# Patient Record
Sex: MALE | URBAN - METROPOLITAN AREA
[De-identification: names, ages, dates, MRNs, and addresses within clinical notes are randomized per-mention and may not be internally consistent; named-entity substitution may affect disease eponyms.]

---

## 2020-05-14 ENCOUNTER — APPOINTMENT (RX ONLY)
Dept: URBAN - METROPOLITAN AREA CLINIC 45 | Facility: CLINIC | Age: 20
Setting detail: DERMATOLOGY
End: 2020-05-14

## 2020-05-14 DIAGNOSIS — L63.8 OTHER ALOPECIA AREATA: ICD-10-CM

## 2020-05-14 DIAGNOSIS — L85.3 XEROSIS CUTIS: ICD-10-CM

## 2020-05-14 DIAGNOSIS — D22 MELANOCYTIC NEVI: ICD-10-CM

## 2020-05-14 PROBLEM — D22.61 MELANOCYTIC NEVI OF RIGHT UPPER LIMB, INCLUDING SHOULDER: Status: ACTIVE | Noted: 2020-05-14

## 2020-05-14 PROCEDURE — ? INTRALESIONAL KENALOG

## 2020-05-14 PROCEDURE — ? TREATMENT REGIMEN

## 2020-05-14 PROCEDURE — ? COUNSELING

## 2020-05-14 PROCEDURE — 99203 OFFICE O/P NEW LOW 30 MIN: CPT | Mod: 25

## 2020-05-14 ASSESSMENT — LOCATION ZONE DERM
LOCATION ZONE: SCALP
LOCATION ZONE: TRUNK
LOCATION ZONE: ARM

## 2020-05-14 ASSESSMENT — LOCATION DETAILED DESCRIPTION DERM
LOCATION DETAILED: LEFT SUPERIOR UPPER BACK
LOCATION DETAILED: RIGHT CENTRAL PARIETAL SCALP
LOCATION DETAILED: RIGHT DISTAL POSTERIOR UPPER ARM

## 2020-05-14 ASSESSMENT — LOCATION SIMPLE DESCRIPTION DERM
LOCATION SIMPLE: LEFT UPPER BACK
LOCATION SIMPLE: RIGHT UPPER ARM
LOCATION SIMPLE: SCALP

## 2020-05-14 NOTE — PROCEDURE: TREATMENT REGIMEN
Detail Level: Zone
Plan: Location: Posterior scalp\\nTreatment: injected with 1cc of 5FU/ILK 40\\n\\nPt is here for hair loss on scalp.\\nPt discussed that her hair loss was sudden and is very concerned.\\nToday pt has circular bald patches throughout her scalp.\\nPictures taken.\\n\\nDiscussed with patient this is also an auto-immune condition usually genetic, onset by increased stress and a lowered immune system.\\nAdvised patient to that we can treatment with 5FU/ILK 40 to help relieve inflammation.\\nDiscussed with pt to start using Rogaine to help produce hair growth from the external as well---discussed hair may fall out for the first few weeks. \\nAlso advised patient to try and keep stress under control in order to prevent further breakouts of hair loss.\\nF/u as needed

## 2020-05-14 NOTE — PROCEDURE: INTRALESIONAL KENALOG
Detail Level: Detailed
Medical Necessity Clause: This procedure was medically necessary because the lesions that were treated were:
X Size Of Lesion In Cm (Optional): 0
Consent: The risks of atrophy were reviewed with the patient.
Total Volume Injected (Ccs- Only Use Numbers And Decimals): 0.1
Concentration Of Solution Injected (Mg/Ml): 2.5
Kenalog Preparation: Kenalog
Include Z78.9 (Other Specified Conditions Influencing Health Status) As An Associated Diagnosis?: No

## 2020-06-11 ENCOUNTER — APPOINTMENT (RX ONLY)
Dept: URBAN - METROPOLITAN AREA CLINIC 45 | Facility: CLINIC | Age: 20
Setting detail: DERMATOLOGY
End: 2020-06-11

## 2020-06-11 DIAGNOSIS — D22 MELANOCYTIC NEVI: ICD-10-CM

## 2020-06-11 DIAGNOSIS — L63.8 OTHER ALOPECIA AREATA: ICD-10-CM

## 2020-06-11 DIAGNOSIS — L85.3 XEROSIS CUTIS: ICD-10-CM

## 2020-06-11 PROBLEM — D22.61 MELANOCYTIC NEVI OF RIGHT UPPER LIMB, INCLUDING SHOULDER: Status: ACTIVE | Noted: 2020-06-11

## 2020-06-11 PROCEDURE — ? INTRALESIONAL KENALOG

## 2020-06-11 PROCEDURE — 99213 OFFICE O/P EST LOW 20 MIN: CPT | Mod: 25

## 2020-06-11 PROCEDURE — ? COUNSELING

## 2020-06-11 PROCEDURE — ? TREATMENT REGIMEN

## 2020-06-11 ASSESSMENT — LOCATION ZONE DERM
LOCATION ZONE: TRUNK
LOCATION ZONE: SCALP
LOCATION ZONE: ARM

## 2020-06-11 ASSESSMENT — LOCATION SIMPLE DESCRIPTION DERM
LOCATION SIMPLE: SCALP
LOCATION SIMPLE: RIGHT UPPER ARM
LOCATION SIMPLE: LEFT UPPER BACK

## 2020-06-11 ASSESSMENT — LOCATION DETAILED DESCRIPTION DERM
LOCATION DETAILED: RIGHT DISTAL POSTERIOR UPPER ARM
LOCATION DETAILED: LEFT SUPERIOR UPPER BACK
LOCATION DETAILED: RIGHT CENTRAL PARIETAL SCALP

## 2020-06-11 NOTE — PROCEDURE: TREATMENT REGIMEN
Detail Level: Zone
Plan: Location: Posterior scalp\\nTreatment: injected with 1cc of 5FU/ILK 40\\n\\nPhotos taken. \\n\\nPatient is here for a six week follow up on hair loss. \\nHe states that he has noticed hair growth after his last injections. \\nHe states that he is pleased to see this growth, but wishes that it would happen a little faster. \\n\\nDiscussed that there is a lot of new growth today which indicates that he is responding well to these injections. \\nDiscussed with patient that today we will inject with 1cc of 5FU/ILK 40 to promote hair growth. \\nDiscussed with pt to start using Rogaine to help produce hair growth from the external as well---discussed hair may fall out for the first few weeks. \\nAlso advised patient to try and keep stress under control in order to prevent further breakouts of hair loss.\\n\\nF/u 6 weeks

## 2020-06-11 NOTE — PROCEDURE: INTRALESIONAL KENALOG
Detail Level: Detailed
Kenalog Preparation: Kenalog
Include Z78.9 (Other Specified Conditions Influencing Health Status) As An Associated Diagnosis?: No
Total Volume Injected (Ccs- Only Use Numbers And Decimals): 0.1
Medical Necessity Clause: This procedure was medically necessary because the lesions that were treated were:
X Size Of Lesion In Cm (Optional): 0
Concentration Of Solution Injected (Mg/Ml): 2.5
Consent: The risks of atrophy were reviewed with the patient.

## 2020-07-23 ENCOUNTER — APPOINTMENT (RX ONLY)
Dept: URBAN - METROPOLITAN AREA CLINIC 45 | Facility: CLINIC | Age: 20
Setting detail: DERMATOLOGY
End: 2020-07-23

## 2020-07-23 DIAGNOSIS — L85.3 XEROSIS CUTIS: ICD-10-CM

## 2020-07-23 DIAGNOSIS — L63.8 OTHER ALOPECIA AREATA: ICD-10-CM

## 2020-07-23 PROCEDURE — ? INTRALESIONAL KENALOG

## 2020-07-23 PROCEDURE — 99213 OFFICE O/P EST LOW 20 MIN: CPT | Mod: 25

## 2020-07-23 PROCEDURE — ? TREATMENT REGIMEN

## 2020-07-23 PROCEDURE — ? COUNSELING

## 2020-07-23 ASSESSMENT — LOCATION DETAILED DESCRIPTION DERM
LOCATION DETAILED: LEFT SUPERIOR UPPER BACK
LOCATION DETAILED: RIGHT CENTRAL PARIETAL SCALP

## 2020-07-23 ASSESSMENT — LOCATION SIMPLE DESCRIPTION DERM
LOCATION SIMPLE: SCALP
LOCATION SIMPLE: LEFT UPPER BACK

## 2020-07-23 ASSESSMENT — LOCATION ZONE DERM
LOCATION ZONE: TRUNK
LOCATION ZONE: SCALP

## 2020-07-23 NOTE — PROCEDURE: TREATMENT REGIMEN
Detail Level: Zone
Plan: Location: Posterior scalp\\nTreatment TODAY : injected with 1cc of 5FU/ILK 40\\n\\n7/23/2020\\n\\nPatient is here for a follow up \\nPatient states that he has noticed significant improvement\\nPatient mentions he is very happy and please with treatment because he can definitely hair growing back \\nCompared pictures with patient and i agree there is a significant improvement \\n\\nDiscussed with patient upon examining his scalp that there is more room for improvement \\nTODAY i will continue treating the scalp with 5FU/40 Kenalog \\Bayron addition i want patient to continue using the Rogaine to help produce hair growth or at least maintain what is present \\nDiscussed about keeping his stress levels down to help prevent any future hair loss  \\n\\n\\nFollow up: 4 weeks \\n-------------------------------\\nPhotos taken. \\n\\nPatient is here for a six week follow up on hair loss. \\nHe states that he has noticed hair growth after his last injections. \\nHe states that he is pleased to see this growth, but wishes that it would happen a little faster. \\n\\nDiscussed that there is a lot of new growth today which indicates that he is responding well to these injections. \\nDiscussed with patient that today we will inject with 1cc of 5FU/ILK 40 to promote hair growth. \\nDiscussed with pt to start using Rogaine to help produce hair growth from the external as well---discussed hair may fall out for the first few weeks. \\nAlso advised patient to try and keep stress under control in order to prevent further breakouts of hair loss.\\n\\nF/u 6 weeks